# Patient Record
(demographics unavailable — no encounter records)

---

## 2024-12-18 NOTE — HISTORY OF PRESENT ILLNESS
[Pain Location] : pain [Lumbar] : lumbar region [Stable] : stable [___ yrs] : [unfilled] year(s) ago [de-identified] :  A 35-Year-old male is here today for an initial visit for back pain for years. Patient has hx of herniated disc. 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 The patient states that he has left leg discrepancy. He notes the left leg is shorter than the right. Patient notes has seen a podiatrist for this issue.

## 2024-12-18 NOTE — PHYSICAL EXAM
[Wide-Based] : wide-based [LE] : Sensory: Intact in bilateral lower extremities [Knee] : patellar 2+ and symmetric bilaterally [Ankle] : ankle 2+ and symmetric bilaterally [Normal RLE] : Right Lower Extremity: No scars, rashes, lesions, ulcers, skin intact [Normal LLE] : Left Lower Extremity: No scars, rashes, lesions, ulcers, skin intact [Normal] : Oriented to person, place, and time, insight and judgement were intact and the affect was normal [de-identified] : The right leg length measuring 96.5 cm  The left leg length measuring 96.5 cm  [de-identified] :  AP & lateral Flexion and Extension X-Rays of the Lumbar spine taken 12/18/2024 shows PA thoracolumbar scoliosis, minor curve measuring 11.1 degrees. Degenerative changes in the lower thoracic and L3-L4, and lower thoracic region.

## 2024-12-18 NOTE — DISCUSSION/SUMMARY
[6 Weeks] : in 6 weeks [de-identified] :  I have discussed the underlying pathophysiology of the patient's condition.  I expressed to patient that his symptoms are consistent with degenerative disc disease in the lower thoracic and L2-3. I highly recommend that the patient starts a course of physical therapy at this time. I have provided the patient with a detailed prescription for physical therapy.   The patient should follow up with me in 6 weeks for further assessment of symptoms, if symptoms persist or worsen, we will consider an MRI of the Lumbar spine.

## 2024-12-18 NOTE — ADDENDUM
[FreeTextEntry1] :  I, Fely Salomond, acted solely as a scribe for Dr. Elmer Hernandez on this date 12/18/2024 .  All medical records entries made by the Scribe were at my Dr. Elmer Hernandez direction and personally dictated by me on 12/18/2024. I have reviewed the chart and agree that the record accurately reflects my personal performance of the history, physical exam, assessment and plan. I have also personally directed, reviewed, and agreed with the chart.